# Patient Record
Sex: FEMALE | Race: WHITE | Employment: STUDENT | URBAN - METROPOLITAN AREA
[De-identification: names, ages, dates, MRNs, and addresses within clinical notes are randomized per-mention and may not be internally consistent; named-entity substitution may affect disease eponyms.]

---
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
## 2024-01-08 NOTE — PROGRESS NOTES
[address] [place]     21  Deana Roblero : 2005 Sex: female  Age: 24 y.o. Chief Complaint   Patient presents with    Abdominal Pain     Lower abdominal pain, across the whole lower    Nausea     She was vomitting a couple days ago but she states that the pain is so bad that it is making her nausea and makes her want to throw up    Chest Pain     Chest pain is in the middle where her sternum is and esophagus       HPI  [facility] by her friend complaining of abdominal pain, chest pain, nausea and vomiting started 3 days ago. 2 days ago she ended up in [facility]  And was felt to have viral URI and tested positive for strep negative for flu. Was started on amoxicillin comes back in today feeling worse. States she never had a sore throat when she was seen the other day. Minimal sinus drainage biggest complaint is the abdominal pain nausea and vomiting. Denies fever. Positive chills. Review of Systems   Constitutional:  Positive for appetite change and chills. Negative for fever. HENT:  Positive for postnasal drip. Negative for congestion, ear pain, sinus pressure, sinus pain and sore throat. Eyes: Negative. Respiratory:  Negative for cough, chest tightness, shortness of breath and wheezing. Cardiovascular:  Positive for chest pain. Gastrointestinal:  Positive for abdominal pain, nausea and vomiting. Negative for diarrhea. Musculoskeletal:  Negative for myalgias. Neurological:  Negative for headaches. REST OF PERTINENT ROS GONE OVER AND WAS NEGATIVE.                  Current Outpatient Medications:     medroxyPROGESTERone (DEPO-PROVERA) 150 MG/ML injection, use as directed at physician's office every 3 months, Disp: , Rfl:     amoxicillin (AMOXIL) 400 MG/5ML suspension, Take 5 mLs by mouth 3 times daily for 10 days, Disp: 150 mL, Rfl: 0  Allergies   Allergen Reactions    Bee Venom Swelling       No past medical history I spoke to pt and let him know this will be sent to his pharmacy tomorrow, when Dr Escalante is back in office.    on file. No past surgical history on file. No family history on file. Social History     Socioeconomic History    Marital status: Single     Spouse name: Not on file    Number of children: Not on file    Years of education: Not on file    Highest education level: Not on file   Occupational History    Not on file   Tobacco Use    Smoking status: Never    Smokeless tobacco: Never   Vaping Use    Vaping Use: Every day    Substances: Nicotine   Substance and Sexual Activity    Alcohol use: Never    Drug use: Not on file    Sexual activity: Not on file   Other Topics Concern    Not on file   Social History Narrative    Not on file     Social Determinants of Health     Financial Resource Strain: Not on file   Food Insecurity: Not on file   Transportation Needs: Not on file   Physical Activity: Not on file   Stress: Not on file   Social Connections: Not on file   Intimate Partner Violence: Not on file   Housing Stability: Not on file       Vitals:    12/08/22 0826   Pulse: 76   Temp: 97.9 °F (36.6 °C)   TempSrc: Temporal   SpO2: 98%   Weight: 122 lb (55.3 kg)   Height: 5' 2\" (1.575 m)       Physical Exam  Constitutional:       General: She is in acute distress. Comments: Abdominal pain   Cardiovascular:      Rate and Rhythm: Normal rate and regular rhythm. Heart sounds: No murmur heard. Pulmonary:      Effort: Pulmonary effort is normal. No respiratory distress. Breath sounds: Normal breath sounds. No wheezing, rhonchi or rales. Abdominal:      General: Bowel sounds are normal. There is no distension. Tenderness: There is abdominal tenderness. There is guarding. Comments: Had tenderness to palpation everywhere. Very uncomfortable with the examination. Musculoskeletal:      Cervical back: Normal range of motion and neck supple. No tenderness. Skin:     General: Skin is warm and dry. Neurological:      Mental Status: She is alert and oriented to person, place, and time.    Psychiatric: Mood and Affect: Mood normal.         Behavior: Behavior normal.               Assessment and Plan:  Yulisa Caraballo was seen today for abdominal pain, nausea and chest pain. Diagnoses and all orders for this visit:    Generalized abdominal pain    Nausea and vomiting, unspecified vomiting type    Chest pain, unspecified type    Plan: With the severity of the abdominal pain is centered through the emergency room. She will need labs and x-rays for further evaluation. Return for to ER. Seen By:  Jasmina Fraire MD      *Document was created using voice recognition software. Note was reviewed however may contain grammatical errors.